# Patient Record
Sex: FEMALE | NOT HISPANIC OR LATINO | URBAN - METROPOLITAN AREA
[De-identification: names, ages, dates, MRNs, and addresses within clinical notes are randomized per-mention and may not be internally consistent; named-entity substitution may affect disease eponyms.]

---

## 2018-10-16 ENCOUNTER — OFFICE VISIT (OUTPATIENT)
Dept: URBAN - METROPOLITAN AREA CLINIC 11 | Facility: CLINIC | Age: 73
End: 2018-10-16
Payer: COMMERCIAL

## 2018-10-16 PROCEDURE — 92134 CPTRZ OPH DX IMG PST SGM RTA: CPT | Performed by: OPTOMETRIST

## 2018-10-16 PROCEDURE — 92014 COMPRE OPH EXAM EST PT 1/>: CPT | Performed by: OPTOMETRIST

## 2018-10-16 ASSESSMENT — INTRAOCULAR PRESSURE
OS: 17
OD: 17

## 2018-10-16 NOTE — IMPRESSION/PLAN
Impression: Age-related nuclear cataract, bilateral: H25.13. Plan: Discussed diagnosis in detail with patient. No treatment is required at this time. Will continue to observe condition and or symptoms. Call if [address] worsens.

## 2018-10-16 NOTE — IMPRESSION/PLAN
Impression: Macular hole of left eye: H35.342. Plan: OCT of macula ordered and performed today. Discussed diagnosis in detail with patient.  Refer to Retinal specialist for eval.

## 2018-10-25 ENCOUNTER — OFFICE VISIT (OUTPATIENT)
Dept: URBAN - METROPOLITAN AREA CLINIC 11 | Facility: CLINIC | Age: 73
End: 2018-10-25
Payer: COMMERCIAL

## 2018-10-25 PROCEDURE — 92134 CPTRZ OPH DX IMG PST SGM RTA: CPT | Performed by: OPHTHALMOLOGY

## 2018-10-25 PROCEDURE — 99214 OFFICE O/P EST MOD 30 MIN: CPT | Performed by: OPHTHALMOLOGY

## 2018-10-25 RX ORDER — PREDNISOLONE ACETATE 10 MG/ML
1 % SUSPENSION/ DROPS OPHTHALMIC
Qty: 10 | Refills: 2 | Status: INACTIVE
Start: 2018-10-25 | End: 2018-12-27

## 2018-10-25 RX ORDER — OFLOXACIN 3 MG/ML
0.3 % SOLUTION/ DROPS OPHTHALMIC
Qty: 1 | Refills: 1 | Status: INACTIVE
Start: 2018-10-25 | End: 2019-06-06

## 2018-10-25 ASSESSMENT — INTRAOCULAR PRESSURE
OS: 16
OD: 16

## 2018-10-25 NOTE — IMPRESSION/PLAN
Impression: Macular cyst, hole, or pseudohole, left eye: H35.342. OS. Condition: established, worsening. Plan: OCT ordered and performed today. Discussed diagnosis with patient. The clinical exam and OCT are consistent with Macular Hole. Given the patients current symptoms and difficulties with daily activities surgical correction is recommended. Recommend sx, after a through discussion of surgical R/B/A. The patient understands the potential risks of sx, including (but not limited to) bleeding, pain, infection, loss of vision, loss of eye and possible need for more sx. The patient was also informed of post operative altitude with caution with face down positioning with gas in the eye. The patient understand if the hole closes successfully the vision usually improves but does not return to normal an improvement may take several months to a year. The patient elects to proceed with sx OS.  RL-2

## 2018-10-25 NOTE — IMPRESSION/PLAN
Impression: Age-related nuclear cataract, bilateral: H25.13. OU. Condition: established, worsening. Plan: Cataracts account for the patient's complaints. Discussed all risks, benefits, procedures and recovery. Patient understands changing glasses will not improve vision. Patient desires to have surgery OS, recommend phacoemulsification with intraocular lens.

## 2018-10-31 ENCOUNTER — OFFICE VISIT (OUTPATIENT)
Dept: URBAN - METROPOLITAN AREA CLINIC 33 | Facility: CLINIC | Age: 73
End: 2018-10-31
Payer: COMMERCIAL

## 2018-10-31 DIAGNOSIS — H35.342 MACULAR CYST, HOLE, OR PSEUDOHOLE, LEFT EYE: ICD-10-CM

## 2018-10-31 DIAGNOSIS — H25.13 AGE-RELATED NUCLEAR CATARACT, BILATERAL: Primary | ICD-10-CM

## 2018-10-31 DIAGNOSIS — H04.123 DRY EYE SYNDROME OF BILATERAL LACRIMAL GLANDS: ICD-10-CM

## 2018-10-31 PROCEDURE — 92136 OPHTHALMIC BIOMETRY: CPT | Performed by: OPHTHALMOLOGY

## 2018-10-31 PROCEDURE — 99214 OFFICE O/P EST MOD 30 MIN: CPT | Performed by: OPHTHALMOLOGY

## 2018-10-31 ASSESSMENT — VISUAL ACUITY
OD: 20/20
OS: 20/80

## 2018-10-31 ASSESSMENT — INTRAOCULAR PRESSURE
OD: 15
OS: 15

## 2018-10-31 ASSESSMENT — KERATOMETRY
OS: 41.25
OD: 41.50

## 2018-10-31 NOTE — IMPRESSION/PLAN
Impression: Dry eye syndrome of bilateral lacrimal glands: H04.123. Plan: Dry eyes account for the patient's complaints. There is no evidence of permanent changes to the cornea. Explained condition does not have a cure and will need artificial tears for maintenance. Advised patient to use AT 2-3 times daily.

## 2018-10-31 NOTE — IMPRESSION/PLAN
Impression: Age-related nuclear cataract, bilateral: H25.13. OU. Condition: established, worsening. Plan: Cataracts account for some decreased vision, however, the patient is aware with macular hole the  level of vision post operatively cannot be determined. Discussed risks, benefits, procedures and recovery. Patient desires surgery, recommend phacoemulsification with intraocular lens ( Combo Sx for Mac Hole OS w/ Dr Jordan Fuller) . If Durezol/Vigamox not covered by insurance, okay to switch to generic. LEFT EYE  ( will Re-Evaluate 2-3 months after first eye for  Right Eye changes) RL2 STANDARD LENS
TARGET PLANO
USE ILEVRO

## 2018-10-31 NOTE — IMPRESSION/PLAN
Impression: Glaucoma Suspect - Low Risk Bilateral: H40.013. (-) Fmhx of Glaucoma Plan: Increased C:D. Suspicious ON. Asymmetric ON. Will have patient come back for full glaucoma work up [place] [address] 24-2 and will Re-evaluate Cataract in OD.

## 2018-10-31 NOTE — IMPRESSION/PLAN
Impression: Macular cyst, hole, or pseudohole, left eye: H35.342. OS. Condition: established, worsening.  Plan: Combo Sx ( Dayday Medel repair)  Dr Santiago Cárdenas & Dr Edna Platt

## 2018-11-06 ENCOUNTER — SURGERY (OUTPATIENT)
Dept: URBAN - METROPOLITAN AREA SURGERY 15 | Facility: SURGERY | Age: 73
End: 2018-11-06
Payer: COMMERCIAL

## 2018-11-06 PROCEDURE — 66984 XCAPSL CTRC RMVL W/O ECP: CPT | Performed by: OPHTHALMOLOGY

## 2018-11-07 ENCOUNTER — POST-OPERATIVE VISIT (OUTPATIENT)
Dept: URBAN - METROPOLITAN AREA CLINIC 11 | Facility: CLINIC | Age: 73
End: 2018-11-07

## 2018-11-07 PROCEDURE — 99024 POSTOP FOLLOW-UP VISIT: CPT | Performed by: OPTOMETRIST

## 2018-11-07 ASSESSMENT — INTRAOCULAR PRESSURE
OS: 36
OD: 18

## 2018-11-09 ENCOUNTER — POST-OPERATIVE VISIT (OUTPATIENT)
Dept: URBAN - METROPOLITAN AREA CLINIC 11 | Facility: CLINIC | Age: 73
End: 2018-11-09

## 2018-11-09 PROCEDURE — 99024 POSTOP FOLLOW-UP VISIT: CPT | Performed by: OPTOMETRIST

## 2018-11-09 ASSESSMENT — INTRAOCULAR PRESSURE
OD: 20
OS: 16

## 2018-11-13 ENCOUNTER — SURGERY (OUTPATIENT)
Dept: URBAN - METROPOLITAN AREA SURGERY 15 | Facility: SURGERY | Age: 73
End: 2018-11-13
Payer: COMMERCIAL

## 2018-11-13 PROCEDURE — 67042 VIT FOR MACULAR HOLE: CPT | Performed by: OPHTHALMOLOGY

## 2018-11-14 ENCOUNTER — POST-OPERATIVE VISIT (OUTPATIENT)
Dept: URBAN - METROPOLITAN AREA CLINIC 11 | Facility: CLINIC | Age: 73
End: 2018-11-14

## 2018-11-14 PROCEDURE — 99024 POSTOP FOLLOW-UP VISIT: CPT | Performed by: OPTOMETRIST

## 2018-11-14 ASSESSMENT — INTRAOCULAR PRESSURE
OS: 28
OD: 20

## 2018-11-20 ENCOUNTER — POST-OPERATIVE VISIT (OUTPATIENT)
Dept: URBAN - METROPOLITAN AREA CLINIC 33 | Facility: CLINIC | Age: 73
End: 2018-11-20

## 2018-11-20 PROCEDURE — 99024 POSTOP FOLLOW-UP VISIT: CPT | Performed by: OPHTHALMOLOGY

## 2018-11-20 ASSESSMENT — INTRAOCULAR PRESSURE
OS: 22
OD: 15

## 2018-11-21 ENCOUNTER — POST-OPERATIVE VISIT (OUTPATIENT)
Dept: URBAN - METROPOLITAN AREA CLINIC 11 | Facility: CLINIC | Age: 73
End: 2018-11-21

## 2018-11-21 PROCEDURE — 99024 POSTOP FOLLOW-UP VISIT: CPT | Performed by: OPTOMETRIST

## 2018-11-21 ASSESSMENT — INTRAOCULAR PRESSURE
OD: 16
OS: 59
OS: 72
OS: 64

## 2018-11-26 ENCOUNTER — POST-OPERATIVE VISIT (OUTPATIENT)
Dept: URBAN - METROPOLITAN AREA CLINIC 11 | Facility: CLINIC | Age: 73
End: 2018-11-26

## 2018-11-26 PROCEDURE — 99024 POSTOP FOLLOW-UP VISIT: CPT | Performed by: OPHTHALMOLOGY

## 2018-11-26 ASSESSMENT — INTRAOCULAR PRESSURE
OD: 15
OS: 26

## 2018-11-27 ENCOUNTER — OFFICE VISIT (OUTPATIENT)
Dept: URBAN - METROPOLITAN AREA CLINIC 33 | Facility: CLINIC | Age: 73
End: 2018-11-27

## 2018-11-27 ENCOUNTER — SURGERY (OUTPATIENT)
Dept: URBAN - METROPOLITAN AREA SURGERY 15 | Facility: SURGERY | Age: 73
End: 2018-11-27
Payer: COMMERCIAL

## 2018-11-27 DIAGNOSIS — H40.032 ANATOMICAL NARROW ANGLE, LEFT EYE: Primary | ICD-10-CM

## 2018-11-27 PROCEDURE — 66761 REVISION OF IRIS: CPT | Performed by: OPHTHALMOLOGY

## 2018-11-27 ASSESSMENT — INTRAOCULAR PRESSURE
OS: 24
OS: 24

## 2018-11-27 NOTE — IMPRESSION/PLAN
Impression: Anatomical narrow angle, left eye: H40.032. Pt needs urgent LPI OS to avoid pressure spike. Plan: LPI OS today.   Follow up tomorrow with Dr. Opal Amaya

## 2018-11-28 ENCOUNTER — POST-OPERATIVE VISIT (OUTPATIENT)
Dept: URBAN - METROPOLITAN AREA CLINIC 11 | Facility: CLINIC | Age: 73
End: 2018-11-28

## 2018-11-28 PROCEDURE — 99024 POSTOP FOLLOW-UP VISIT: CPT | Performed by: OPTOMETRIST

## 2018-11-28 ASSESSMENT — INTRAOCULAR PRESSURE
OD: 21
OS: 22

## 2018-11-29 ENCOUNTER — POST-OPERATIVE VISIT (OUTPATIENT)
Dept: URBAN - METROPOLITAN AREA CLINIC 11 | Facility: CLINIC | Age: 73
End: 2018-11-29

## 2018-11-29 DIAGNOSIS — Z09 ENCNTR FOR F/U EXAM AFT TRTMT FOR COND OTH THAN MALIG NEOPLM: Primary | ICD-10-CM

## 2018-11-29 PROCEDURE — 99024 POSTOP FOLLOW-UP VISIT: CPT | Performed by: OPHTHALMOLOGY

## 2018-11-29 ASSESSMENT — INTRAOCULAR PRESSURE
OD: 18
OS: 18

## 2018-12-04 ENCOUNTER — POST-OPERATIVE VISIT (OUTPATIENT)
Dept: URBAN - METROPOLITAN AREA CLINIC 33 | Facility: CLINIC | Age: 73
End: 2018-12-04

## 2018-12-04 PROCEDURE — 99024 POSTOP FOLLOW-UP VISIT: CPT | Performed by: OPHTHALMOLOGY

## 2018-12-04 ASSESSMENT — INTRAOCULAR PRESSURE
OD: 18
OS: 18

## 2018-12-27 ENCOUNTER — POST-OPERATIVE VISIT (OUTPATIENT)
Dept: URBAN - METROPOLITAN AREA CLINIC 11 | Facility: CLINIC | Age: 73
End: 2018-12-27

## 2018-12-27 PROCEDURE — 99024 POSTOP FOLLOW-UP VISIT: CPT | Performed by: OPHTHALMOLOGY

## 2018-12-27 RX ORDER — PREDNISOLONE ACETATE 10 MG/ML
1 % SUSPENSION/ DROPS OPHTHALMIC
Qty: 10 | Refills: 2 | Status: INACTIVE
Start: 2018-12-27 | End: 2019-06-14

## 2018-12-27 ASSESSMENT — INTRAOCULAR PRESSURE
OD: 19
OS: 13

## 2019-01-17 ENCOUNTER — POST-OPERATIVE VISIT (OUTPATIENT)
Dept: URBAN - METROPOLITAN AREA CLINIC 11 | Facility: CLINIC | Age: 74
End: 2019-01-17

## 2019-01-17 PROCEDURE — 99024 POSTOP FOLLOW-UP VISIT: CPT | Performed by: OPHTHALMOLOGY

## 2019-01-17 ASSESSMENT — INTRAOCULAR PRESSURE
OS: 11
OD: 16

## 2019-01-31 ENCOUNTER — SURGERY (OUTPATIENT)
Dept: URBAN - METROPOLITAN AREA SURGERY 15 | Facility: SURGERY | Age: 74
End: 2019-01-31
Payer: COMMERCIAL

## 2019-01-31 PROCEDURE — 65875 INCISE INNER EYE ADHESIONS: CPT | Performed by: OPHTHALMOLOGY

## 2019-02-01 ENCOUNTER — POST-OPERATIVE VISIT (OUTPATIENT)
Dept: URBAN - METROPOLITAN AREA CLINIC 33 | Facility: CLINIC | Age: 74
End: 2019-02-01

## 2019-02-01 PROCEDURE — 99024 POSTOP FOLLOW-UP VISIT: CPT | Performed by: OPTOMETRIST

## 2019-02-01 ASSESSMENT — INTRAOCULAR PRESSURE
OD: 17
OS: 17

## 2019-02-07 ENCOUNTER — POST-OPERATIVE VISIT (OUTPATIENT)
Dept: URBAN - METROPOLITAN AREA CLINIC 11 | Facility: CLINIC | Age: 74
End: 2019-02-07

## 2019-02-07 PROCEDURE — 99024 POSTOP FOLLOW-UP VISIT: CPT | Performed by: OPHTHALMOLOGY

## 2019-02-07 ASSESSMENT — INTRAOCULAR PRESSURE
OS: 17
OD: 18

## 2019-03-07 ENCOUNTER — POST-OPERATIVE VISIT (OUTPATIENT)
Dept: URBAN - METROPOLITAN AREA CLINIC 11 | Facility: CLINIC | Age: 74
End: 2019-03-07

## 2019-03-07 PROCEDURE — 99024 POSTOP FOLLOW-UP VISIT: CPT | Performed by: OPHTHALMOLOGY

## 2019-03-07 ASSESSMENT — INTRAOCULAR PRESSURE
OS: 16
OD: 18

## 2019-03-28 ENCOUNTER — OFFICE VISIT (OUTPATIENT)
Dept: URBAN - METROPOLITAN AREA CLINIC 11 | Facility: CLINIC | Age: 74
End: 2019-03-28
Payer: COMMERCIAL

## 2019-03-28 DIAGNOSIS — H52.4 PRESBYOPIA: Primary | ICD-10-CM

## 2019-03-28 ASSESSMENT — KERATOMETRY
OD: 41.50
OS: 41.63

## 2019-03-28 ASSESSMENT — INTRAOCULAR PRESSURE
OD: 18
OS: 18

## 2019-03-28 ASSESSMENT — VISUAL ACUITY
OS: 20/40
OD: 20/25

## 2019-06-06 ENCOUNTER — OFFICE VISIT (OUTPATIENT)
Dept: URBAN - METROPOLITAN AREA CLINIC 11 | Facility: CLINIC | Age: 74
End: 2019-06-06
Payer: COMMERCIAL

## 2019-06-06 DIAGNOSIS — H40.013 GLAUCOMA SUSPECT - LOW RISK BILATERAL: ICD-10-CM

## 2019-06-06 PROCEDURE — 92134 CPTRZ OPH DX IMG PST SGM RTA: CPT | Performed by: OPHTHALMOLOGY

## 2019-06-06 PROCEDURE — 99213 OFFICE O/P EST LOW 20 MIN: CPT | Performed by: OPHTHALMOLOGY

## 2019-06-06 ASSESSMENT — INTRAOCULAR PRESSURE
OS: 17
OD: 16

## 2019-06-06 NOTE — IMPRESSION/PLAN
Impression: Macular cyst, hole, or pseudohole, left eye: H35.342. OS. Condition: established,stable. s/p PPVx Plan: OCT ordered and performed today. Discussed diagnosis with patient. The clinical exam and OCT shows Macular Hole repair in the left eye. s/p PPVx. Recommend observation at this time. Patient will call if vision worsens. She agrees with plan.

## 2019-06-06 NOTE — IMPRESSION/PLAN
Impression: Glaucoma Suspect - Low Risk Bilateral: H40.013. OU. Plan: Discussed diagnosis in detail with patient. Advised patient of condition.  Consult recommended with Dr. Felecia Jett for optic nerve eval.

## 2019-06-14 ENCOUNTER — OFFICE VISIT (OUTPATIENT)
Dept: URBAN - METROPOLITAN AREA CLINIC 11 | Facility: CLINIC | Age: 74
End: 2019-06-14
Payer: COMMERCIAL

## 2019-06-14 PROCEDURE — 92133 CPTRZD OPH DX IMG PST SGM ON: CPT | Performed by: OPHTHALMOLOGY

## 2019-06-14 PROCEDURE — 76514 ECHO EXAM OF EYE THICKNESS: CPT | Performed by: OPHTHALMOLOGY

## 2019-06-14 PROCEDURE — 99214 OFFICE O/P EST MOD 30 MIN: CPT | Performed by: OPHTHALMOLOGY

## 2019-06-14 PROCEDURE — 92020 GONIOSCOPY: CPT | Performed by: OPHTHALMOLOGY

## 2019-06-14 RX ORDER — TRAVOPROST 0.04 MG/ML
0.004 % SOLUTION/ DROPS OPHTHALMIC
Qty: 1 | Refills: 3 | Status: INACTIVE
Start: 2019-06-14 | End: 2020-01-07

## 2019-06-14 RX ORDER — PREDNISOLONE ACETATE 10 MG/ML
1 % SUSPENSION/ DROPS OPHTHALMIC
Qty: 1 | Refills: 0 | Status: INACTIVE
Start: 2019-06-14 | End: 2019-11-21

## 2019-06-14 ASSESSMENT — INTRAOCULAR PRESSURE
OD: 15
OS: 17

## 2019-06-14 NOTE — IMPRESSION/PLAN
Impression: Chronic angle-closure glaucoma, bilateral, indeterminate stage: N56.2096. /512, 6/19 OCT 80/66 8/8 Plan: ok for LPI OD in ASC, RL2. Start Travatan QHS OU (sample dispensed to patient and sent to pharmacy)  Discussed narrow angles, risk of angle closure, symptoms of AACG and Laser peripheral iridotomy (LPI) risk/benefits/alternatives. Discussed that pt should avoid dilation and anti-depression, anti-anxiety, anti-histamine, or other medications contraindicated in angle closure glaucoma until the laser has been performed. Discussed risk of irreversible vision loss with glaucoma. Pt voiced understanding.

## 2019-07-22 ENCOUNTER — SURGERY (OUTPATIENT)
Dept: URBAN - METROPOLITAN AREA SURGERY 15 | Facility: SURGERY | Age: 74
End: 2019-07-22
Payer: COMMERCIAL

## 2019-07-22 PROCEDURE — 66761 REVISION OF IRIS: CPT | Performed by: OPHTHALMOLOGY

## 2019-07-29 ENCOUNTER — POST-OPERATIVE VISIT (OUTPATIENT)
Dept: URBAN - METROPOLITAN AREA CLINIC 11 | Facility: CLINIC | Age: 74
End: 2019-07-29

## 2019-07-29 PROCEDURE — 99024 POSTOP FOLLOW-UP VISIT: CPT | Performed by: OPTOMETRIST

## 2019-07-29 RX ORDER — LATANOPROST 50 UG/ML
0.005 % SOLUTION OPHTHALMIC
Qty: 2.5 | Refills: 9 | Status: INACTIVE
Start: 2019-07-29 | End: 2020-08-17

## 2019-07-29 ASSESSMENT — INTRAOCULAR PRESSURE
OD: 18
OS: 18

## 2019-08-28 ENCOUNTER — OFFICE VISIT (OUTPATIENT)
Dept: URBAN - METROPOLITAN AREA CLINIC 11 | Facility: CLINIC | Age: 74
End: 2019-08-28
Payer: COMMERCIAL

## 2019-08-28 DIAGNOSIS — H40.2234 CHRONIC ANGLE-CLOSURE GLAUCOMA, BILATERAL, INDETERMINATE STAGE: Primary | ICD-10-CM

## 2019-08-28 PROCEDURE — 99213 OFFICE O/P EST LOW 20 MIN: CPT | Performed by: OPHTHALMOLOGY

## 2019-08-28 ASSESSMENT — INTRAOCULAR PRESSURE
OS: 17
OD: 17

## 2019-08-28 NOTE — IMPRESSION/PLAN
Impression: Chronic angle-closure glaucoma, bilateral, indeterminate stage: M58.7034. /512, 6/19 OCT 80/66 8/8 S/P LPI OU Plan: Discussed diagnosis with patient. Recommend patient Travatan QHS OU, when patient runs put ok to start Latanoprost QHS OU. Will continue to monitor. 2 month HVF, Optos and IOP check Dr Hugh Bertrand.

## 2019-10-24 ENCOUNTER — OFFICE VISIT (OUTPATIENT)
Dept: URBAN - METROPOLITAN AREA CLINIC 11 | Facility: CLINIC | Age: 74
End: 2019-10-24
Payer: COMMERCIAL

## 2019-10-24 DIAGNOSIS — H35.341 MACULAR CYST, HOLE, OR PSEUDOHOLE, RIGHT EYE: Primary | ICD-10-CM

## 2019-10-24 PROCEDURE — 92014 COMPRE OPH EXAM EST PT 1/>: CPT | Performed by: OPTOMETRIST

## 2019-10-24 PROCEDURE — 92134 CPTRZ OPH DX IMG PST SGM RTA: CPT | Performed by: OPTOMETRIST

## 2019-10-24 ASSESSMENT — INTRAOCULAR PRESSURE
OS: 14
OD: 14

## 2019-10-24 NOTE — IMPRESSION/PLAN
Impression: Macular cyst, hole, or pseudohole, right eye: H35.341. Plan: OCT of mac ordered and performed today ou. Macular hole OD. Refer to retinal specialist for eval/tx.

## 2019-10-31 ENCOUNTER — OFFICE VISIT (OUTPATIENT)
Dept: URBAN - METROPOLITAN AREA CLINIC 33 | Facility: CLINIC | Age: 74
End: 2019-10-31
Payer: COMMERCIAL

## 2019-10-31 DIAGNOSIS — H43.821 VITREOMACULAR TRACTION OF RIGHT EYE: Primary | ICD-10-CM

## 2019-10-31 DIAGNOSIS — H43.311 VITREOUS MEMBRANES AND STRANDS, RIGHT EYE: ICD-10-CM

## 2019-10-31 PROCEDURE — 92134 CPTRZ OPH DX IMG PST SGM RTA: CPT | Performed by: OPHTHALMOLOGY

## 2019-10-31 PROCEDURE — 92014 COMPRE OPH EXAM EST PT 1/>: CPT | Performed by: OPHTHALMOLOGY

## 2019-10-31 RX ORDER — LOTEPREDNOL ETABONATE 5 MG/ML
0.5 % SUSPENSION/ DROPS OPHTHALMIC
Qty: 5 | Refills: 5 | Status: INACTIVE
Start: 2019-10-31 | End: 2020-01-07

## 2019-10-31 RX ORDER — OFLOXACIN 3 MG/ML
0.3 % SOLUTION/ DROPS OPHTHALMIC
Qty: 5 | Refills: 3 | Status: INACTIVE
Start: 2019-10-31 | End: 2020-01-07

## 2019-10-31 ASSESSMENT — INTRAOCULAR PRESSURE
OD: 13
OS: 13

## 2019-10-31 NOTE — IMPRESSION/PLAN
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 OD. Condition: worsening. Vision: vision affected. Plan: Discussed diagnosis in detail with patient. Exam OD shows VMA, no Macular Hole. Discussed sx vs observation. Patient states vision is blurry, condition distorts her vision and interferes with her daily activities. Discussed risks of progression. Surgical treatment is recommended to repair the retina PPVx RIGHT EYE with short acting gas. Surgical risks and benefits were discussed, explained and understood by patient. Unable to tell how much vision will be recovered. Discussed gas bubble and post-op care: no traveling, flying or high altitude for approximately 4 - 5  weeks. All questions answered. Patient elects to proceed with recommendation. RL1. Educational material provided to patient. Patient states she had elevated IOP OS post retina surgery OS 11/13/2018 and hopes this does not happen again. Erx Lotemax (no Prednisolone or Durezol due to possible steroid response) and Ofloxacin to patient's pharmacy. Recommend to continue using Travatan Z OU QHS (or Lumigan OU QHS) as recommended by Dr June Light.

## 2019-10-31 NOTE — IMPRESSION/PLAN
Impression: Vitreous membranes and strands, right eye: H43.311. OD. Condition: worsening. Vision: vision affected. Plan: Discussed diagnosis in detail with patient. Discussed risks of progression. Recommend surgery OD - see notes above.

## 2019-11-20 ENCOUNTER — SURGERY (OUTPATIENT)
Dept: URBAN - METROPOLITAN AREA SURGERY 15 | Facility: SURGERY | Age: 74
End: 2019-11-20
Payer: COMMERCIAL

## 2019-11-20 PROCEDURE — 67042 VIT FOR MACULAR HOLE: CPT | Performed by: OPHTHALMOLOGY

## 2019-11-21 ENCOUNTER — POST-OPERATIVE VISIT (OUTPATIENT)
Dept: URBAN - METROPOLITAN AREA CLINIC 11 | Facility: CLINIC | Age: 74
End: 2019-11-21

## 2019-11-21 ASSESSMENT — INTRAOCULAR PRESSURE
OS: 12
OD: 13

## 2019-11-27 ENCOUNTER — POST-OPERATIVE VISIT (OUTPATIENT)
Dept: URBAN - METROPOLITAN AREA CLINIC 11 | Facility: CLINIC | Age: 74
End: 2019-11-27

## 2019-11-27 PROCEDURE — 99024 POSTOP FOLLOW-UP VISIT: CPT | Performed by: OPTOMETRIST

## 2019-11-27 ASSESSMENT — INTRAOCULAR PRESSURE
OS: 14
OD: 14

## 2019-12-16 ENCOUNTER — POST-OPERATIVE VISIT (OUTPATIENT)
Dept: URBAN - METROPOLITAN AREA CLINIC 33 | Facility: CLINIC | Age: 74
End: 2019-12-16

## 2019-12-16 PROCEDURE — 99024 POSTOP FOLLOW-UP VISIT: CPT | Performed by: OPHTHALMOLOGY

## 2019-12-16 ASSESSMENT — INTRAOCULAR PRESSURE
OS: 14
OD: 14

## 2019-12-30 ENCOUNTER — TESTING ONLY (OUTPATIENT)
Dept: URBAN - METROPOLITAN AREA CLINIC 11 | Facility: CLINIC | Age: 74
End: 2019-12-30
Payer: COMMERCIAL

## 2019-12-30 PROCEDURE — 92083 EXTENDED VISUAL FIELD XM: CPT | Performed by: OPHTHALMOLOGY

## 2020-01-07 ENCOUNTER — OFFICE VISIT (OUTPATIENT)
Dept: URBAN - METROPOLITAN AREA CLINIC 11 | Facility: CLINIC | Age: 75
End: 2020-01-07
Payer: COMMERCIAL

## 2020-01-07 DIAGNOSIS — H25.11 AGE-RELATED NUCLEAR CATARACT, RIGHT EYE: ICD-10-CM

## 2020-01-07 PROCEDURE — 99213 OFFICE O/P EST LOW 20 MIN: CPT | Performed by: OPHTHALMOLOGY

## 2020-01-07 ASSESSMENT — INTRAOCULAR PRESSURE
OS: 15
OD: 15

## 2020-01-07 NOTE — IMPRESSION/PLAN
Impression: Chronic angle-closure glaucoma, bilateral, mild stage: Q10.6061. /512, 6/19 OCT 80/66 8/8, 12/19 HVF FULL OU 
S/P LPI OU Plan: Discussed diagnosis with patient. HVF and Optos reviewed with patient. Recommend patient continue Latanoprost QHS OU. Will continue to monitor. 3 month IOP check Dr Francisca Ortiz.

## 2020-01-07 NOTE — IMPRESSION/PLAN
Impression: Age-related nuclear cataract, right eye: H25.11. Plan: OD: Discussed diagnosis in detail with patient. Advised patient of condition. Will continue to observe condition and or symptoms.  Return in 3 months for DE/cat eval/MAC OCT

## 2020-05-06 ENCOUNTER — OFFICE VISIT (OUTPATIENT)
Dept: URBAN - METROPOLITAN AREA CLINIC 11 | Facility: CLINIC | Age: 75
End: 2020-05-06
Payer: COMMERCIAL

## 2020-05-06 PROCEDURE — 99214 OFFICE O/P EST MOD 30 MIN: CPT | Performed by: OPHTHALMOLOGY

## 2020-05-06 PROCEDURE — 92134 CPTRZ OPH DX IMG PST SGM RTA: CPT | Performed by: OPHTHALMOLOGY

## 2020-05-06 ASSESSMENT — KERATOMETRY
OS: 41.25
OD: 41.38

## 2020-05-06 ASSESSMENT — INTRAOCULAR PRESSURE
OD: 16
OS: 14

## 2020-05-06 ASSESSMENT — VISUAL ACUITY
OS: 20/40
OD: 20/50

## 2020-05-06 NOTE — IMPRESSION/PLAN
Impression: Age-related nuclear cataract, right eye: H25.11. MAC OCT- Thinning OS Plan: OK for ce/iol OD w/ OMNI  in ASC, RL2. Distance target. Standard IOL. Discussed lens options, Toric/Trifocal. Discussed ORA. Pt is NOT a candidate for premium lens. Discussed need for glasses for near vision with standard IOL and possibly Trifocal as well. Discussed diagnosis of cataracts. Cataracts are limiting vision. Discussed risks, benefits and alternatives to surgery including but not limited to: bleeding, infection, risk of vision loss, loss of the eye, need for other surgery. Patient voiced understanding and wishes to proceed.

## 2020-05-06 NOTE — IMPRESSION/PLAN
Impression: Chronic angle-closure glaucoma, bilateral, mild stage: V64.1796. /512, 6/19 OCT 80/66 8/8, 12/19 HVF FULL OU, GCC 63/52 S/P LPI OU Plan: Discussed diagnosis with patient. Discussed treatment options. Recommend patient continue Latanoprost QHS OU. Recommend OMNI OD (if unable due to insurance coverage, second choice iStent). Discussed glaucoma and cataracts. Discussed risks, benefits and alternatives to MIGS procedure. Pt voiced understanding and desires to proceed.

## 2020-05-26 ENCOUNTER — PRE-OPERATIVE VISIT (OUTPATIENT)
Dept: URBAN - METROPOLITAN AREA CLINIC 11 | Facility: CLINIC | Age: 75
End: 2020-05-26
Payer: COMMERCIAL

## 2020-05-26 PROCEDURE — 92136 OPHTHALMIC BIOMETRY: CPT | Performed by: OPHTHALMOLOGY

## 2020-05-26 ASSESSMENT — PACHYMETRY
OS: 22.94
OD: 23.40
OS: 3.68
OD: 2.29

## 2020-06-10 ENCOUNTER — POST-OPERATIVE VISIT (OUTPATIENT)
Dept: URBAN - METROPOLITAN AREA CLINIC 11 | Facility: CLINIC | Age: 75
End: 2020-06-10

## 2020-06-10 ENCOUNTER — SURGERY (OUTPATIENT)
Dept: URBAN - METROPOLITAN AREA SURGERY 20 | Facility: SURGERY | Age: 75
End: 2020-06-10
Payer: COMMERCIAL

## 2020-06-10 PROCEDURE — 0191T INSERTION OF ANTERIOR SEGMENT AQUEOUS DRAINAGE DEVICE, W/OUT EXTRAOCULAR RESERVO: CPT | Performed by: OPHTHALMOLOGY

## 2020-06-10 PROCEDURE — 66984 XCAPSL CTRC RMVL W/O ECP: CPT | Performed by: OPHTHALMOLOGY

## 2020-06-10 PROCEDURE — 0376T INSERT ANT SEG AQUEOUS DEVICE; EA ADDTL: CPT | Performed by: OPHTHALMOLOGY

## 2020-06-10 PROCEDURE — 99024 POSTOP FOLLOW-UP VISIT: CPT | Performed by: OPTOMETRIST

## 2020-06-10 RX ORDER — LOTEPREDNOL ETABONATE 5 MG/ML
0.5 % SUSPENSION/ DROPS OPHTHALMIC
Qty: 5 | Refills: 5 | Status: INACTIVE
Start: 2020-06-10 | End: 2020-08-17

## 2020-06-10 RX ORDER — OFLOXACIN 3 MG/ML
0.3 % SOLUTION/ DROPS OPHTHALMIC
Qty: 5 | Refills: 3 | Status: INACTIVE
Start: 2020-06-10 | End: 2020-08-17

## 2020-06-10 ASSESSMENT — INTRAOCULAR PRESSURE
OD: 32
OD: 32

## 2020-06-11 ENCOUNTER — POST-OPERATIVE VISIT (OUTPATIENT)
Dept: URBAN - METROPOLITAN AREA CLINIC 11 | Facility: CLINIC | Age: 75
End: 2020-06-11

## 2020-06-11 PROCEDURE — 99024 POSTOP FOLLOW-UP VISIT: CPT | Performed by: OPTOMETRIST

## 2020-06-11 ASSESSMENT — INTRAOCULAR PRESSURE
OD: 22
OS: 16

## 2020-06-17 ENCOUNTER — POST-OPERATIVE VISIT (OUTPATIENT)
Dept: URBAN - METROPOLITAN AREA CLINIC 11 | Facility: CLINIC | Age: 75
End: 2020-06-17

## 2020-06-17 PROCEDURE — 99024 POSTOP FOLLOW-UP VISIT: CPT | Performed by: OPHTHALMOLOGY

## 2020-06-17 ASSESSMENT — VISUAL ACUITY: OD: 20/70

## 2020-06-17 ASSESSMENT — INTRAOCULAR PRESSURE
OD: 17
OS: 15

## 2020-07-15 ENCOUNTER — POST-OPERATIVE VISIT (OUTPATIENT)
Dept: URBAN - METROPOLITAN AREA CLINIC 11 | Facility: CLINIC | Age: 75
End: 2020-07-15

## 2020-07-15 PROCEDURE — 99024 POSTOP FOLLOW-UP VISIT: CPT | Performed by: OPTOMETRIST

## 2020-07-15 ASSESSMENT — INTRAOCULAR PRESSURE
OD: 16
OS: 16

## 2020-07-15 ASSESSMENT — VISUAL ACUITY
OD: 20/25-
OS: 20/30

## 2020-10-15 ENCOUNTER — OFFICE VISIT (OUTPATIENT)
Dept: URBAN - METROPOLITAN AREA CLINIC 11 | Facility: CLINIC | Age: 75
End: 2020-10-15
Payer: COMMERCIAL

## 2020-10-15 PROCEDURE — 99213 OFFICE O/P EST LOW 20 MIN: CPT | Performed by: OPTOMETRIST

## 2020-10-15 ASSESSMENT — INTRAOCULAR PRESSURE
OS: 16
OD: 16

## 2020-10-15 NOTE — IMPRESSION/PLAN
Impression: Chronic angle-closure glaucoma, bilateral, mild stage: X66.3270. /512, 6/19 OCT 80/66 8/8, 12/19 HVF FULL OU, GCC 63/52 S/P LPI OU Plan: IOP at good level today. Continue Latanoprost QHS OS.  Schedule VF 24-2. 
f/u 4 months IOP & VF

## 2021-03-03 ENCOUNTER — OFFICE VISIT (OUTPATIENT)
Dept: URBAN - METROPOLITAN AREA CLINIC 11 | Facility: CLINIC | Age: 76
End: 2021-03-03
Payer: COMMERCIAL

## 2021-03-03 PROCEDURE — 99213 OFFICE O/P EST LOW 20 MIN: CPT | Performed by: OPTOMETRIST

## 2021-03-03 PROCEDURE — 92083 EXTENDED VISUAL FIELD XM: CPT | Performed by: OPTOMETRIST

## 2021-03-03 ASSESSMENT — INTRAOCULAR PRESSURE
OS: 15
OD: 15

## 2021-03-03 NOTE — IMPRESSION/PLAN
Impression: Chronic angle-closure glaucoma, bilateral, mild stage: C41.3999. /512, 6/19 OCT 80/66 8/8, HVF 3/21 FULL OU, GCC 63/52 S/P LPI OU Plan: VF ordered and performed today ou. VF normal ou. IOP at good level today.  Continue Latanoprost QHS OS. 
f/u 4 months IOP DE OCT

## 2021-07-07 ENCOUNTER — OFFICE VISIT (OUTPATIENT)
Dept: URBAN - METROPOLITAN AREA CLINIC 11 | Facility: CLINIC | Age: 76
End: 2021-07-07
Payer: COMMERCIAL

## 2021-07-07 DIAGNOSIS — H26.491 OTHER SECONDARY CATARACT, RIGHT EYE: ICD-10-CM

## 2021-07-07 PROCEDURE — 92133 CPTRZD OPH DX IMG PST SGM ON: CPT | Performed by: OPTOMETRIST

## 2021-07-07 PROCEDURE — 99214 OFFICE O/P EST MOD 30 MIN: CPT | Performed by: OPTOMETRIST

## 2021-07-07 RX ORDER — LATANOPROST 50 UG/ML
0.005 % SOLUTION OPHTHALMIC
Qty: 2.5 | Refills: 9 | Status: INACTIVE
Start: 2021-07-07 | End: 2022-03-30

## 2021-07-07 ASSESSMENT — INTRAOCULAR PRESSURE
OD: 17
OS: 16

## 2021-07-07 NOTE — IMPRESSION/PLAN
Impression: Chronic angle-closure glaucoma, bilateral, mild stage: A59.8301. /512, OCT 07/21 8/9 83/59, HVF 3/21 FULL OU, GCC 63/52 S/P LPI OU Plan: OCT RNFL ordered and performed today ou. OCT RNFL OD normal OS. IOP at good level today. Continue Latanoprost QHS OS. 
f/u 4 months for IOP check and photos.

## 2021-08-18 ENCOUNTER — SURGERY (OUTPATIENT)
Dept: URBAN - METROPOLITAN AREA SURGERY 20 | Facility: SURGERY | Age: 76
End: 2021-08-18
Payer: COMMERCIAL

## 2021-08-18 PROCEDURE — 66821 AFTER CATARACT LASER SURGERY: CPT | Performed by: OPHTHALMOLOGY

## 2021-08-19 ENCOUNTER — POST-OPERATIVE VISIT (OUTPATIENT)
Dept: URBAN - METROPOLITAN AREA CLINIC 11 | Facility: CLINIC | Age: 76
End: 2021-08-19
Payer: COMMERCIAL

## 2021-08-19 PROCEDURE — 99024 POSTOP FOLLOW-UP VISIT: CPT | Performed by: OPTOMETRIST

## 2021-08-19 RX ORDER — PREDNISOLONE ACETATE 10 MG/ML
1 % SUSPENSION/ DROPS OPHTHALMIC
Qty: 5 | Refills: 1 | Status: ACTIVE
Start: 2021-08-19

## 2021-08-19 ASSESSMENT — INTRAOCULAR PRESSURE
OD: 13
OS: 14

## 2021-08-19 NOTE — IMPRESSION/PLAN
Impression: S/P YAG Capsulotomy (Yttrium Aluminum [place]) OD - 1 Day. Encounter for surgical aftercare following surgery on a sense organ  Z48.810.  Plan: drops Rx PF 1gt qid od

## 2021-08-31 ENCOUNTER — POST-OPERATIVE VISIT (OUTPATIENT)
Dept: URBAN - METROPOLITAN AREA CLINIC 11 | Facility: CLINIC | Age: 76
End: 2021-08-31
Payer: COMMERCIAL

## 2021-08-31 DIAGNOSIS — Z48.810 ENCOUNTER FOR SURGICAL AFTERCARE FOLLOWING SURGERY ON A SENSE ORGAN: Primary | ICD-10-CM

## 2021-08-31 PROCEDURE — 99024 POSTOP FOLLOW-UP VISIT: CPT | Performed by: OPTOMETRIST

## 2021-08-31 ASSESSMENT — INTRAOCULAR PRESSURE
OS: 15
OD: 15

## 2021-08-31 NOTE — IMPRESSION/PLAN
Impression: S/P YAG Capsulotomy (Yttrium Aluminum [place]) OD - 13 Days. Encounter for surgical aftercare following surgery on a sense organ  Z48.810.  Post operative instructions reviewed - Plan: PF 1% q12h 1 week, qd 1 week then stop OD

## 2021-11-30 ENCOUNTER — OFFICE VISIT (OUTPATIENT)
Dept: URBAN - METROPOLITAN AREA CLINIC 11 | Facility: CLINIC | Age: 76
End: 2021-11-30
Payer: COMMERCIAL

## 2021-11-30 PROCEDURE — 92250 FUNDUS PHOTOGRAPHY W/I&R: CPT | Performed by: OPTOMETRIST

## 2021-11-30 PROCEDURE — 99213 OFFICE O/P EST LOW 20 MIN: CPT | Performed by: OPTOMETRIST

## 2021-11-30 RX ORDER — GABAPENTIN 600 MG/1
600 MG TABLET, FILM COATED ORAL
Qty: 0 | Refills: 0 | Status: ACTIVE
Start: 2021-11-30

## 2021-11-30 ASSESSMENT — INTRAOCULAR PRESSURE
OS: 14
OD: 14

## 2021-11-30 NOTE — IMPRESSION/PLAN
Impression: Chronic angle-closure glaucoma, bilateral, mild stage: R94.5963. /512, OCT 07/21 8/9 83/59, HVF 3/21 FULL OU, GCC 63/52; Photos 11/21 S/P LPI OU Plan: Photos ordered and performed today ou. IOP at good level today.  Continue Latanoprost QHS OS. 
f/u 4 months for IOP and VF 24-2

## 2022-03-30 ENCOUNTER — OFFICE VISIT (OUTPATIENT)
Dept: URBAN - METROPOLITAN AREA CLINIC 11 | Facility: CLINIC | Age: 77
End: 2022-03-30
Payer: COMMERCIAL

## 2022-03-30 DIAGNOSIS — H40.2231 CHRONIC ANGLE-CLOSURE GLAUCOMA, BILATERAL, MILD STAGE: Primary | ICD-10-CM

## 2022-03-30 PROCEDURE — 99213 OFFICE O/P EST LOW 20 MIN: CPT | Performed by: OPTOMETRIST

## 2022-03-30 PROCEDURE — 92083 EXTENDED VISUAL FIELD XM: CPT | Performed by: OPTOMETRIST

## 2022-03-30 RX ORDER — LATANOPROST 50 UG/ML
0.005 % SOLUTION OPHTHALMIC
Qty: 2.5 | Refills: 9 | Status: ACTIVE
Start: 2022-03-30

## 2022-03-30 ASSESSMENT — INTRAOCULAR PRESSURE
OS: 14
OD: 16

## 2022-03-30 NOTE — IMPRESSION/PLAN
Impression: Chronic angle-closure glaucoma, bilateral, mild stage: Z95.9824. /512, OCT 07/21 8/9 83/59, HVF 3/22 OD FULL, OS scattered ; GCC 63/52; Photos 11/21 S/P LPI OU Plan: VF ordered and performed today ou. VF defect OS - mild. IOP at good level today.  Continue Latanoprost QHS OS. 
f/u 4 months for IOP mac OCT GCL

## 2022-07-25 ENCOUNTER — OFFICE VISIT (OUTPATIENT)
Facility: LOCATION | Age: 77
End: 2022-07-25
Payer: COMMERCIAL

## 2022-07-25 DIAGNOSIS — H40.2231 CHRONIC ANGLE-CLOSURE GLAUCOMA, BILATERAL, MILD STAGE: Primary | ICD-10-CM

## 2022-07-25 PROCEDURE — 92134 CPTRZ OPH DX IMG PST SGM RTA: CPT | Performed by: OPTOMETRIST

## 2022-07-25 PROCEDURE — 99213 OFFICE O/P EST LOW 20 MIN: CPT | Performed by: OPTOMETRIST

## 2022-07-25 ASSESSMENT — INTRAOCULAR PRESSURE
OS: 14
OD: 13
OS: 13
OD: 14

## 2022-07-25 NOTE — IMPRESSION/PLAN
Impression: Chronic angle-closure glaucoma, bilateral, mild stage: Z43.9478. Plan: Condition and IOP are stable today. No changes being made to current treatment at this time. Continue Latanoprost. Emphasized and explained compliance. Reassured patient of current condition and treatment and discussed risks of glaucoma progression. Will continue to monitor IOP. RTC: 4 months


 IOP (date) 14/13 Treatment: Latanoprost. s/p LPI OU
C/D ratio: .75/.85
OCTG (date): 07/25/22 thinning OU 
24-2 (date):
Gonio (date): Pachy (date): Allergies: 
Surgery: 
Family History: 
Notes:

## 2022-11-23 ENCOUNTER — OFFICE VISIT (OUTPATIENT)
Facility: LOCATION | Age: 77
End: 2022-11-23
Payer: COMMERCIAL

## 2022-11-23 DIAGNOSIS — H40.2231 CHRONIC ANGLE-CLOSURE GLAUCOMA, BILATERAL, MILD STAGE: Primary | ICD-10-CM

## 2022-11-23 PROCEDURE — 99213 OFFICE O/P EST LOW 20 MIN: CPT | Performed by: OPTOMETRIST

## 2022-11-23 RX ORDER — LATANOPROST 50 UG/ML
0.005 % SOLUTION OPHTHALMIC
Qty: 2.5 | Refills: 9 | Status: ACTIVE
Start: 2022-11-23

## 2022-11-23 ASSESSMENT — INTRAOCULAR PRESSURE
OS: 14
OD: 15

## 2022-11-23 NOTE — IMPRESSION/PLAN
Impression: Chronic angle-closure glaucoma, bilateral, mild stage: A76.7560. Plan: Condition and IOP are stable today. No changes being made to current treatment at this time. Continue Latanoprost QHS OS. Emphasized and explained compliance. Reassured patient of current condition and treatment and discussed risks of glaucoma progression. Will continue to monitor IOP. RTC: 4 months 24-2 IOP (11/23/22) 15/14 Target: historically mid teens okay Treatment: Latanoprost. s/p LPI OU
C/D ratio: .75/.85
OCTG (date): 07/25/22 thinning OU 
24-2 (date):
Gonio (date): Pachy (date): Allergies: 
Surgery: 
Family History: 
Notes:

## 2023-03-29 ENCOUNTER — OFFICE VISIT (OUTPATIENT)
Facility: LOCATION | Age: 78
End: 2023-03-29
Payer: COMMERCIAL

## 2023-03-29 DIAGNOSIS — H40.2231 CHRONIC ANGLE-CLOSURE GLAUCOMA, BILATERAL, MILD STAGE: Primary | ICD-10-CM

## 2023-03-29 PROCEDURE — 99213 OFFICE O/P EST LOW 20 MIN: CPT | Performed by: OPTOMETRIST

## 2023-03-29 PROCEDURE — 92083 EXTENDED VISUAL FIELD XM: CPT | Performed by: OPTOMETRIST

## 2023-03-29 RX ORDER — LATANOPROST 50 UG/ML
0.005 % SOLUTION OPHTHALMIC
Qty: 2.5 | Refills: 9 | Status: ACTIVE
Start: 2023-03-29

## 2023-03-29 ASSESSMENT — INTRAOCULAR PRESSURE
OS: 15
OD: 15

## 2023-03-29 NOTE — IMPRESSION/PLAN
Impression: Chronic angle-closure glaucoma, bilateral, mild stage: J32.8242. Plan: Condition and IOP are stable today. No changes being made to current treatment at this time. Continue Latanoprost QHS OS. Emphasized and explained compliance. Reassured patient of current condition and treatment and discussed risks of glaucoma progression. Will continue to monitor IOP. RTC: 4 months IOP check with RNFL OCT


IOP:  15/15 Target: historically mid teens okay Treatment: Latanoprost QHS OS, s/p LPI OU
C/D ratio: .75/.85
OCTG (07/25/2022): thinning OU 
24-2 (03/29/2023): OD: Good- unreliable, nonspecific defects; OS: Good- S cluster, stable Pachy (date): Allergies: none Surgery: LPI OU Family History: unknown Notes:

## 2023-07-28 ENCOUNTER — OFFICE VISIT (OUTPATIENT)
Facility: LOCATION | Age: 78
End: 2023-07-28
Payer: COMMERCIAL

## 2023-07-28 DIAGNOSIS — H04.123 TEAR FILM INSUFFICIENCY OF BILATERAL LACRIMAL GLANDS: ICD-10-CM

## 2023-07-28 DIAGNOSIS — H40.2231 CHRONIC ANGLE-CLOSURE GLAUCOMA, BILATERAL, MILD STAGE: Primary | ICD-10-CM

## 2023-07-28 PROCEDURE — 99213 OFFICE O/P EST LOW 20 MIN: CPT | Performed by: OPTOMETRIST

## 2023-07-28 PROCEDURE — 92133 CPTRZD OPH DX IMG PST SGM ON: CPT | Performed by: OPTOMETRIST

## 2023-07-28 RX ORDER — LATANOPROST 50 UG/ML
0.005 % SOLUTION OPHTHALMIC
Qty: 2.5 | Refills: 9 | Status: ACTIVE
Start: 2023-07-28

## 2023-07-28 ASSESSMENT — INTRAOCULAR PRESSURE
OD: 16
OS: 16

## 2023-11-28 ENCOUNTER — OFFICE VISIT (OUTPATIENT)
Facility: LOCATION | Age: 78
End: 2023-11-28
Payer: COMMERCIAL

## 2023-11-28 DIAGNOSIS — H35.373 PUCKERING OF MACULA, BILATERAL: ICD-10-CM

## 2023-11-28 DIAGNOSIS — H40.2231 CHRONIC ANGLE-CLOSURE GLAUCOMA, BILATERAL, MILD STAGE: Primary | ICD-10-CM

## 2023-11-28 PROCEDURE — 92134 CPTRZ OPH DX IMG PST SGM RTA: CPT | Performed by: OPTOMETRIST

## 2023-11-28 PROCEDURE — 92014 COMPRE OPH EXAM EST PT 1/>: CPT | Performed by: OPTOMETRIST

## 2023-11-28 ASSESSMENT — INTRAOCULAR PRESSURE
OD: 13
OS: 13

## 2024-04-23 ENCOUNTER — OFFICE VISIT (OUTPATIENT)
Facility: LOCATION | Age: 79
End: 2024-04-23
Payer: COMMERCIAL

## 2024-04-23 DIAGNOSIS — H35.373 PUCKERING OF MACULA, BILATERAL: ICD-10-CM

## 2024-04-23 DIAGNOSIS — H40.2231 CHRONIC ANGLE-CLOSURE GLAUCOMA, BILATERAL, MILD STAGE: Primary | ICD-10-CM

## 2024-04-23 DIAGNOSIS — H04.123 TEAR FILM INSUFFICIENCY OF BILATERAL LACRIMAL GLANDS: ICD-10-CM

## 2024-04-23 PROCEDURE — 99213 OFFICE O/P EST LOW 20 MIN: CPT | Performed by: OPTOMETRIST

## 2024-04-23 PROCEDURE — 92083 EXTENDED VISUAL FIELD XM: CPT | Performed by: OPTOMETRIST

## 2024-04-23 RX ORDER — LATANOPROST 50 UG/ML
0.005 % SOLUTION OPHTHALMIC
Qty: 2.5 | Refills: 6 | Status: ACTIVE
Start: 2024-04-23

## 2024-04-23 ASSESSMENT — INTRAOCULAR PRESSURE
OD: 12
OS: 13

## 2024-05-29 ENCOUNTER — OFFICE VISIT (OUTPATIENT)
Facility: LOCATION | Age: 79
End: 2024-05-29
Payer: COMMERCIAL

## 2024-05-29 DIAGNOSIS — H43.812 VITREOUS DEGENERATION, LEFT EYE: Primary | ICD-10-CM

## 2024-05-29 PROCEDURE — 99214 OFFICE O/P EST MOD 30 MIN: CPT | Performed by: OPTOMETRIST

## 2024-05-29 ASSESSMENT — INTRAOCULAR PRESSURE
OD: 15
OD: 17
OS: 17
OS: 13

## 2024-05-29 ASSESSMENT — KERATOMETRY
OS: 41.50
OD: 41.38

## 2024-06-26 ENCOUNTER — OFFICE VISIT (OUTPATIENT)
Facility: LOCATION | Age: 79
End: 2024-06-26
Payer: COMMERCIAL

## 2024-06-26 DIAGNOSIS — H43.812 VITREOUS DEGENERATION, LEFT EYE: Primary | ICD-10-CM

## 2024-06-26 PROCEDURE — 99213 OFFICE O/P EST LOW 20 MIN: CPT | Performed by: OPTOMETRIST

## 2024-06-26 ASSESSMENT — INTRAOCULAR PRESSURE
OD: 16
OS: 14

## 2024-09-13 ENCOUNTER — OFFICE VISIT (OUTPATIENT)
Facility: LOCATION | Age: 79
End: 2024-09-13
Payer: COMMERCIAL

## 2024-09-13 DIAGNOSIS — H40.2231 CHRONIC ANGLE-CLOSURE GLAUCOMA, BILATERAL, MILD STAGE: Primary | ICD-10-CM

## 2024-09-13 DIAGNOSIS — H04.123 TEAR FILM INSUFFICIENCY OF BILATERAL LACRIMAL GLANDS: ICD-10-CM

## 2024-09-13 DIAGNOSIS — H35.373 PUCKERING OF MACULA, BILATERAL: ICD-10-CM

## 2024-09-13 PROCEDURE — 92014 COMPRE OPH EXAM EST PT 1/>: CPT | Performed by: OPTOMETRIST

## 2024-09-13 PROCEDURE — 92133 CPTRZD OPH DX IMG PST SGM ON: CPT | Performed by: OPTOMETRIST

## 2024-09-13 PROCEDURE — 92134 CPTRZ OPH DX IMG PST SGM RTA: CPT | Performed by: OPTOMETRIST

## 2024-09-13 RX ORDER — LATANOPROST 50 UG/ML
0.005 % SOLUTION OPHTHALMIC
Qty: 2.5 | Refills: 3 | Status: ACTIVE
Start: 2024-09-13

## 2024-09-13 ASSESSMENT — INTRAOCULAR PRESSURE
OS: 13
OD: 16

## 2024-09-13 ASSESSMENT — VISUAL ACUITY
OS: 20/40
OD: 20/25

## 2025-01-13 ENCOUNTER — OFFICE VISIT (OUTPATIENT)
Facility: LOCATION | Age: 80
End: 2025-01-13
Payer: COMMERCIAL

## 2025-01-13 DIAGNOSIS — H40.2231 CHRONIC ANGLE-CLOSURE GLAUCOMA, BILATERAL, MILD STAGE: Primary | ICD-10-CM

## 2025-01-13 PROCEDURE — 92134 CPTRZ OPH DX IMG PST SGM RTA: CPT | Performed by: OPTOMETRIST

## 2025-01-13 PROCEDURE — 99213 OFFICE O/P EST LOW 20 MIN: CPT | Performed by: OPTOMETRIST

## 2025-01-13 ASSESSMENT — INTRAOCULAR PRESSURE
OS: 14
OD: 14

## 2025-05-12 ENCOUNTER — OFFICE VISIT (OUTPATIENT)
Facility: LOCATION | Age: 80
End: 2025-05-12
Payer: COMMERCIAL

## 2025-05-12 DIAGNOSIS — H40.2231 CHRONIC ANGLE-CLOSURE GLAUCOMA, BILATERAL, MILD STAGE: Primary | ICD-10-CM

## 2025-05-12 PROCEDURE — 92083 EXTENDED VISUAL FIELD XM: CPT | Performed by: OPTOMETRIST

## 2025-05-12 PROCEDURE — 99213 OFFICE O/P EST LOW 20 MIN: CPT | Performed by: OPTOMETRIST

## 2025-05-12 ASSESSMENT — INTRAOCULAR PRESSURE
OS: 14
OD: 14